# Patient Record
Sex: FEMALE | Race: WHITE | NOT HISPANIC OR LATINO
[De-identification: names, ages, dates, MRNs, and addresses within clinical notes are randomized per-mention and may not be internally consistent; named-entity substitution may affect disease eponyms.]

---

## 2022-06-21 ENCOUNTER — TRANSCRIPTION ENCOUNTER (OUTPATIENT)
Age: 77
End: 2022-06-21

## 2022-06-22 ENCOUNTER — EMERGENCY (EMERGENCY)
Facility: HOSPITAL | Age: 77
LOS: 1 days | Discharge: HOME | End: 2022-06-22
Admitting: EMERGENCY MEDICINE
Payer: MEDICARE

## 2022-06-22 ENCOUNTER — APPOINTMENT (OUTPATIENT)
Age: 77
End: 2022-06-22

## 2022-06-22 VITALS
HEIGHT: 62 IN | DIASTOLIC BLOOD PRESSURE: 78 MMHG | TEMPERATURE: 98 F | HEART RATE: 65 BPM | OXYGEN SATURATION: 95 % | WEIGHT: 123.9 LBS | RESPIRATION RATE: 16 BRPM | SYSTOLIC BLOOD PRESSURE: 128 MMHG

## 2022-06-22 VITALS
DIASTOLIC BLOOD PRESSURE: 69 MMHG | TEMPERATURE: 98 F | OXYGEN SATURATION: 95 % | HEART RATE: 60 BPM | SYSTOLIC BLOOD PRESSURE: 119 MMHG | RESPIRATION RATE: 18 BRPM

## 2022-06-22 PROCEDURE — L9998: CPT

## 2022-06-22 RX ORDER — BEBTELOVIMAB 87.5 MG/ML
175 INJECTION, SOLUTION INTRAVENOUS ONCE
Refills: 0 | Status: COMPLETED | OUTPATIENT
Start: 2022-06-22 | End: 2022-06-22

## 2022-06-22 RX ADMIN — BEBTELOVIMAB 175 MILLIGRAM(S): 87.5 INJECTION, SOLUTION INTRAVENOUS at 12:12

## 2022-06-22 NOTE — CHART NOTE - NSCHARTNOTEFT_GEN_A_CORE
This is a 76y Female with hx HTN, preDM who presents for monoclonal antibody treatment. Patient tested positive for COVID on 6/17 via rapid testing. Patient does not require supplemental oxygen.     Vital signs throughout treatment: T(C): --  HR: --  BP: --  RR: --  SpO2: --    Patient educated on risks/ benefits of MAB treatment. Patient informed of potential side effects such as rash, fatigue, diarrhea. Consent signed and witnessed by RN. Patient tolerated procedure with no adverse reaction. Patient observed for one hour post treatment. Patient educated on discharge information. This is a 76y Female with hx HTN, preDM who presents for monoclonal antibody treatment. Patient tested positive for COVID on 6/17 via rapid testing. Patient does not require supplemental oxygen.     Vital Signs throughout treatment:   T(C): 36.6 (22 Jun 2022 12:00), Max: 36.6 (22 Jun 2022 12:00)  T(F): 97.8 (22 Jun 2022 12:00), Max: 97.8 (22 Jun 2022 12:00)  HR: 65 (22 Jun 2022 12:00) (65 - 65)  BP: 128/78 (22 Jun 2022 12:00) (128/78 - 128/78)  BP(mean): --  RR: 16 (22 Jun 2022 12:00) (16 - 16)  SpO2: 95% (22 Jun 2022 12:00) (95% - 95%)    Patient educated on risks/ benefits of MAB treatment. Patient informed of potential side effects such as rash, fatigue, diarrhea. Consent signed and witnessed by RN. Patient tolerated procedure with no adverse reaction. Patient observed for one hour post treatment. Patient educated on discharge information.

## 2022-06-23 DIAGNOSIS — U07.1 COVID-19: ICD-10-CM

## 2022-06-23 DIAGNOSIS — I10 ESSENTIAL (PRIMARY) HYPERTENSION: ICD-10-CM

## 2023-12-14 PROBLEM — Z00.00 ENCOUNTER FOR PREVENTIVE HEALTH EXAMINATION: Status: ACTIVE | Noted: 2023-12-14
